# Patient Record
Sex: FEMALE | Race: WHITE | URBAN - METROPOLITAN AREA
[De-identification: names, ages, dates, MRNs, and addresses within clinical notes are randomized per-mention and may not be internally consistent; named-entity substitution may affect disease eponyms.]

---

## 2020-10-30 ENCOUNTER — TELEPHONE (OUTPATIENT)
Dept: ORTHOPEDICS | Facility: CLINIC | Age: 50
End: 2020-10-30

## 2020-11-02 NOTE — TELEPHONE ENCOUNTER
RECORDS RECEIVED FROM: Left proximal fibula chondroid lesion. Referred by Dr Manuel DEEO   DATE RECEIVED: Nov 3, 2020     NOTES STATUS DETAILS   OFFICE NOTE from referring provider Received    OFFICE NOTE from other specialist N/A    DISCHARGE SUMMARY from hospital N/A    DISCHARGE REPORT from the ER N/A    OPERATIVE REPORT N/A    MEDICATION LIST Internal    IMPLANT RECORD/STICKER N/A    LABS     CBC/DIFF N/A    CULTURES N/A    INJECTIONS DONE IN RADIOLOGY N/A    MRI Received    CT SCAN N/A    XRAYS (IMAGES & REPORTS) Received    TUMOR     PATHOLOGY  Slides & report N/A      11/02/20   7:18 AM   Resolved images in PACS  Complete  Carlyn Omer CMA

## 2020-11-03 ENCOUNTER — VIRTUAL VISIT (OUTPATIENT)
Dept: ORTHOPEDICS | Facility: CLINIC | Age: 50
End: 2020-11-03
Payer: COMMERCIAL

## 2020-11-03 ENCOUNTER — PRE VISIT (OUTPATIENT)
Dept: ORTHOPEDICS | Facility: CLINIC | Age: 50
End: 2020-11-03

## 2020-11-03 VITALS — HEIGHT: 64 IN | BODY MASS INDEX: 38.93 KG/M2 | WEIGHT: 228 LBS

## 2020-11-03 DIAGNOSIS — D16.9 ENCHONDROMA OF BONE: Primary | ICD-10-CM

## 2020-11-03 PROBLEM — J41.0 SIMPLE CHRONIC BRONCHITIS (H): Status: ACTIVE | Noted: 2017-12-10

## 2020-11-03 PROCEDURE — 99202 OFFICE O/P NEW SF 15 MIN: CPT | Mod: 95 | Performed by: ORTHOPAEDIC SURGERY

## 2020-11-03 RX ORDER — FLUTICASONE PROPIONATE 110 UG/1
2 AEROSOL, METERED RESPIRATORY (INHALATION)
COMMUNITY
Start: 2020-04-10

## 2020-11-03 RX ORDER — FLUTICASONE PROPIONATE 50 MCG
2 SPRAY, SUSPENSION (ML) NASAL
COMMUNITY
Start: 2018-11-30

## 2020-11-03 ASSESSMENT — MIFFLIN-ST. JEOR: SCORE: 1649.2

## 2020-11-03 NOTE — LETTER
"    11/3/2020         RE: Rissa Silva  [address] [place] [address]      Rissa Silva is a 50 year old female who is being evaluated via a billable telephone visit.      The patient has been notified of following:     \"This telephone visit will be conducted via a call between you and your physician/provider. We have found that certain health care needs can be provided without the need for a physical exam.  This service lets us provide the care you need with a short phone conversation.  If a prescription is necessary we can send it directly to your pharmacy.  If lab work is needed we can place an order for that and you can then stop by our lab to have the test done at a later time.    Telephone visits are billed at different rates depending on your insurance coverage. During this emergency period, for some insurers they may be billed the same as an in-person visit.  Please reach out to your insurance provider with any questions.    If during the course of the call the physician/provider feels a telephone visit is not appropriate, you will not be charged for this service.\"    Patient has given verbal consent for Telephone visit?  Yes    What phone number would you like to be contacted at? [phone].    How would you like to obtain your AVS? Mail a copy        49 yo with c/o postero lateral left knee discomfort.  Gives past history of left knee injury x 2 in March. With pain at that time.  She reports that the pain recurred recently and is now bothering her often during the day and at night.    She was seen at [facility] in [place].  X-ray and MRI scan diagnosed a lesion in the left proximal fibula.  She is seen today for evaluation of this.    Review of her imaging shows what is a enchondroma in the left proximal fibula.  There is no soft tissue mass or obvious cortical erosion.  The patient reports that she has been told she has a small lateral meniscus tear.  I was not able to access an MRI report.    The " patient was frustrated when I reported to her that I thought the enchondroma was an incidental finding and was not exposed blaming her pain.  I also recommended that she not undergo any surgery for the enchondroma.  I then explained to her that the [facility] group and will culminate is excellent.  I recommend she return to see  to gain benefit of his recommendation.  If he is not a surgeon and she want to see a surgeon I recommended Dr. Dung Pearson or any of Dr Bettencourt's surgical partners.    Impression: Enchondroma left proximal fibula.  I believe this is an incidental finding.    Plan: No follow-up needed for the enchondroma.  She will follow-up with Dr. Bettencourt's team.    The patient was encountered today through a phone call.  The call was 14 minutes.        Diego Clement MD

## 2020-11-03 NOTE — LETTER
"    11/3/2020         RE: Rissa Silva  [address]  [facility] [address]        Dear Colleague,    Thank you for referring your patient, Rissa Silva, to the [facility] [place]. Please see a copy of my visit note below.    Rissa Silva is a 50 year old female who is being evaluated via a billable telephone visit.      The patient has been notified of following:     \"This telephone visit will be conducted via a call between you and your physician/provider. We have found that certain health care needs can be provided without the need for a physical exam.  This service lets us provide the care you need with a short phone conversation.  If a prescription is necessary we can send it directly to your pharmacy.  If lab work is needed we can place an order for that and you can then stop by our lab to have the test done at a later time.    Telephone visits are billed at different rates depending on your insurance coverage. During this emergency period, for some insurers they may be billed the same as an in-person visit.  Please reach out to your insurance provider with any questions.    If during the course of the call the physician/provider feels a telephone visit is not appropriate, you will not be charged for this service.\"    Patient has given verbal consent for Telephone visit?  Yes    What phone number would you like to be contacted at? [phone].    How would you like to obtain your AVS? Mail a copy        49 yo with c/o postero lateral left knee discomfort.  Gives past history of left knee injury x 2 in March. With pain at that time.  She reports that the pain recurred recently and is now bothering her often during the day and at night.    She was seen at [facility] in [place].  X-ray and MRI scan diagnosed a lesion in the left proximal fibula.  She is seen today for evaluation of this.    Review of her imaging shows what is a enchondroma in the left proximal fibula.  There is no " soft tissue mass or obvious cortical erosion.  The patient reports that she has been told she has a small lateral meniscus tear.  I was not able to access an MRI report.    The patient was frustrated when I reported to her that I thought the enchondroma was an incidental finding and was not exposed blaming her pain.  I also recommended that she not undergo any surgery for the enchondroma.  I then explained to her that the [facility] and will culminate is excellent.  I recommend she return to see  to gain benefit of his recommendation.  If he is not a surgeon and she want to see a surgeon I recommended Dr. Dung Pearson or any of Dr Bettencourt's surgical partners.    Impression: Enchondroma left proximal fibula.  I believe this is an incidental finding.    Plan: No follow-up needed for the enchondroma.  She will follow-up with Dr. Bettencourt's team.    The patient was encountered today through a phone call.  The call was 14 minutes.      Diego Clement MD

## 2020-11-03 NOTE — PROGRESS NOTES
"Rissa Silva is a 50 year old female who is being evaluated via a billable telephone visit.      The patient has been notified of following:     \"This telephone visit will be conducted via a call between you and your physician/provider. We have found that certain health care needs can be provided without the need for a physical exam.  This service lets us provide the care you need with a short phone conversation.  If a prescription is necessary we can send it directly to your pharmacy.  If lab work is needed we can place an order for that and you can then stop by our lab to have the test done at a later time.    Telephone visits are billed at different rates depending on your insurance coverage. During this emergency period, for some insurers they may be billed the same as an in-person visit.  Please reach out to your insurance provider with any questions.    If during the course of the call the physician/provider feels a telephone visit is not appropriate, you will not be charged for this service.\"    Patient has given verbal consent for Telephone visit?  Yes    What phone number would you like to be contacted at? [phone].    How would you like to obtain your AVS? Mail a copy      "

## 2020-11-03 NOTE — NURSING NOTE
"Chief Complaint   Patient presents with     Consult     Pt. states that she is being seen today after havibg an MRI for Left proximal fibula chondroid lesion. Referred by Dr Manuel YIN       50 year old  1970    Ht 1.626 m (5' 4\")   Wt 103.4 kg (228 lb)   BMI 39.14 kg/m          [facility] - [place], [place] - [address]    Allergies   Allergen Reactions     Augmentin      Other reaction(s): Yeast Infection  Gets bad yeast infection       Current Outpatient Medications   Medication     fluticasone (FLONASE) 50 MCG/ACT nasal spray     fluticasone (FLOVENT HFA) 110 MCG/ACT inhaler     No current facility-administered medications for this visit.                      "

## 2020-11-03 NOTE — PROGRESS NOTES
49 yo with c/o postero lateral left knee discomfort.  Gives past history of left knee injury x 2 in March. With pain at that time.  She reports that the pain recurred recently and is now bothering her often during the day and at night.    She was seen at [facility] in [place].  X-ray and MRI scan diagnosed a lesion in the left proximal fibula.  She is seen today for evaluation of this.    Review of her imaging shows what is a enchondroma in the left proximal fibula.  There is no soft tissue mass or obvious cortical erosion.  The patient reports that she has been told she has a small lateral meniscus tear.  I was not able to access an MRI report.    The patient was frustrated when I reported to her that I thought the enchondroma was an incidental finding and was not exposed blaming her pain.  I also recommended that she not undergo any surgery for the enchondroma.  I then explained to her that the [facility] and will culminate is excellent.  I recommend she return to see  to gain benefit of his recommendation.  If he is not a surgeon and she want to see a surgeon I recommended Dr. Dung Pearson or any of Dr Bettencourt's surgical partners.    Impression: Enchondroma left proximal fibula.  I believe this is an incidental finding.    Plan: No follow-up needed for the enchondroma.  She will follow-up with Dr. Bettencourt's team.    The patient was encountered today through a phone call.  The call was 14 minutes.